# Patient Record
Sex: FEMALE | Race: WHITE | NOT HISPANIC OR LATINO | URBAN - METROPOLITAN AREA
[De-identification: names, ages, dates, MRNs, and addresses within clinical notes are randomized per-mention and may not be internally consistent; named-entity substitution may affect disease eponyms.]

---

## 2017-03-08 ENCOUNTER — ALLSCRIPTS OFFICE VISIT (OUTPATIENT)
Dept: OTHER | Facility: OTHER | Age: 57
End: 2017-03-08

## 2017-03-11 LAB
HPV 18 (HISTORICAL): NOT DETECTED
HPV HIGH RISK 16/18 (HISTORICAL): NOT DETECTED
HPV16 (HISTORICAL): NOT DETECTED
PAP (HISTORICAL): NORMAL

## 2017-11-15 DIAGNOSIS — E03.9 HYPOTHYROIDISM: ICD-10-CM

## 2017-11-15 DIAGNOSIS — E55.9 VITAMIN D DEFICIENCY: ICD-10-CM

## 2017-11-15 DIAGNOSIS — Z13.1 ENCOUNTER FOR SCREENING FOR DIABETES MELLITUS: ICD-10-CM

## 2017-11-15 DIAGNOSIS — Z86.39 PERSONAL HISTORY OF OTHER ENDOCRINE, NUTRITIONAL AND METABOLIC DISEASE: ICD-10-CM

## 2018-01-13 VITALS
DIASTOLIC BLOOD PRESSURE: 74 MMHG | BODY MASS INDEX: 25.52 KG/M2 | HEIGHT: 63 IN | WEIGHT: 144 LBS | SYSTOLIC BLOOD PRESSURE: 128 MMHG

## 2018-01-18 NOTE — PROGRESS NOTES
Assessment    1  Encounter for gynecological examination without abnormal finding (V72 31) (Z01 419)    Plan  Encounter for gynecological examination without abnormal finding    · Decreasing the stress in your life may help your condition improve ; Status:Complete;    Done: [id number]   Ordered;  For:Encounter for gynecological examination without abnormal finding; Ordered By:Socorro Camarillo;   · Drink plenty of fluids ; Status:Complete;   Done: 80CXM7951   Ordered;  For:Encounter for gynecological examination without abnormal finding; Ordered By:Socorro Camarillo;   · Eat a normal well-balanced diet ; Status:Complete;   Done: 36QNJ3448   Ordered;  For:Encounter for gynecological examination without abnormal finding; Ordered By:Socorro Camarillo;   · Vitamins can help you get daily requirements that your diet may not be giving you ;  Status:Complete;   Done: 46LTL2109   Ordered;  For:Encounter for gynecological examination without abnormal finding; Ordered By:Socorro Camarillo;   · We encourage all of our patients to exercise regularly  30 minutes of exercise or physical  activity five or more days a week is recommended for children and adults ;  Status:Complete;   Done: 49ZKB0157   Ordered;  For:Encounter for gynecological examination without abnormal finding; Ordered By:Socorro Camarillo;   · We recommend a colonoscopy ; Status:Complete;   Done: 82FYU2976   Ordered;  For:Encounter for gynecological examination without abnormal finding; Ordered By:Monae Camarillo;   · (B) PAP WITH HPV PLUS; Status: In Progress - Specimen/Data Collected;   Done:  63LYN0907   Perform:[employer]; MUR:[id number]; Ordered;  For:Encounter for gynecological examination without abnormal finding; Ordered By:Socorro Camarillo;   · Follow-up visit in 1 year Evaluation and Treatment  Follow-up  Status: Complete  Done:  81RSQ3499   Ordered;  For: Encounter for gynecological examination without abnormal finding; Ordered By: Gilbert Tucker Performed:  Due: 63FMX6956; Last Updated By: Pati Echeverria; 3/8/2017 5:16:43 PM    Discussion/Summary  healthy adult female HPV and Pap Co-testing Done Today Breast cancer screening: mammogram has been ordered  Colorectal cancer screening: the next colonoscopy is due 2017  Chief Complaint  Patient here for yearly exam       History of Present Illness  GYN , Adult Female [facility]: The patient is being seen for a gynecology evaluation  The last health maintenance visit was 1 year(s) ago  Social History: Household members include spouse, 3 daughter(s), 2 son(s) and twins, (trevin, son) another son and daugheter 25-19  She is   General Health: The patient's health since the last visit is described as good  Lifestyle:  She consumes a diverse and healthy diet  She does not have any weight concerns  She exercises regularly  She does not use tobacco  She denies alcohol use  She denies drug use  Reproductive health: the patient is postmenopausal   she uses contraception  she is sexually active  pregnancy history: G 3P 4   Paragard, inserted 2011  Screening: Cervical cancer screening includes uncertain timing of her last pap smear and uncertain timing of her last human papilloma virus screening  Breast cancer screening includes Thermogram done 1/2017, wnl as per patient  Colorectal cancer screening includes a colonoscopy performed 2007  Review of Systems  no pelvic pain, no pelvic pressure, no vaginal pain, no vaginal discharge, no vaginal itching, no vaginal lump or mass, no vaginal odor, no dysuria, no change in urinary frequency, no feelings of urinary urgency and no urinary loss of control    The patient presents with complaints of nonmenstrual bleeding (some irregular bleeding  uses essential oils has been experimenting with different combinations)  Additional findings include has had paraguard in for 14 years       Constitutional: No fever, no chills, feels well, no tiredness, no recent weight gain or loss  ENT: no ear ache, no loss of hearing, no nosebleeds or nasal discharge, no sore throat or hoarseness  Cardiovascular: no complaints of slow or fast heart rate, no chest pain, no palpitations, no leg claudication or lower extremity edema  Respiratory: no complaints of shortness of breath, no wheezing, no dyspnea on exertion, no orthopnea or PND  Breasts: no complaints of breast pain, breast lump or nipple discharge  Gastrointestinal: no complaints of abdominal pain, no constipation, no nausea or diarrhea, no vomiting, no bloody stools  Genitourinary: no complaints of dysuria, no incontinence, no pelvic pain, no dysmenorrhea, no vaginal discharge or abnormal vaginal bleeding  Musculoskeletal: no complaints of arthralgia, no myalgia, no joint swelling or stiffness, no limb pain or swelling  Integumentary: no complaints of skin rash or lesion, no itching or dry skin, no skin wounds  Neurological: no complaints of headache, no confusion, no numbness or tingling, no dizziness or fainting  Active Problems    1  Colon cancer screening (V76 51) (Z12 11)   2  Encounter for mammogram to establish baseline mammogram (V76 12) (Z12 31)   3  Hypothyroidism (244 9) (E03 9)   4  Screening for diabetes mellitus (V77 1) (Z13 1)   5   White coat syndrome with high blood pressure without hypertension (796 2) (R03 0)    Past Medical History    · History of Chronic headaches (784 0) (R51)   · History of hypothyroidism (V12 29) (Z86 39)    Surgical History    · History of Appendectomy   · History of Eye Surgery    Family History  Mother    · Family history of diabetes mellitus (V18 0) (Z83 3)  Father    · Family history of Non-melanoma skin cancer  Daughter    · Family history of thyroid disease (V18 19) (Z83 49)  Maternal Grandmother    · Family history of diabetes mellitus (V18 0) (Z83 3)    Social History    · Never a smoker   · Occasional alcohol use    Current Meds   1  Krill Oil 1000 MG Oral Capsule; Take one capsule; Therapy: (Recorded:77Hlr6939) to Recorded   2  Multi For Her 50+ CAPS; Therapy: (Recorded:90Dbs9086) to Recorded   3  Nature-Throid 48 75 MG Oral Tablet; TAKE 1 TABLET BY MOUTH EVERY MORNING   BEFORE BREAKFAST ON EMPTY STOMACH; Therapy: 11Xut9708 to (Evaluate:95Pfc8512)  Requested for: 80Sbe9298; Last   Rx:30Uub4238 Ordered   4  Probiotic Oral Capsule; Therapy: (21 ) to Recorded   5  Vitamin D3 TABS; Therapy: (Recorded:20Pxf0820) to Recorded    Allergies    1  Penicillins    Vitals   Recorded: 53EDK5675 34:51UT   Systolic 824   Diastolic 74   Height 5 ft 2 7 in   Weight 144 lb    BMI Calculated 25 75   BSA Calculated 1 68   LMP      Physical Exam    Constitutional   General appearance: No acute distress, well appearing and well nourished  Genitourinary   External genitalia: Normal and no lesions appreciated  Vagina: Normal, no lesions or dryness appreciated  Urethra: Normal     Urethral meatus: Normal     Bladder: Normal, soft, non-tender and no prolapse or masses appreciated  Cervix: Normal, no palpable masses  (string grasped- IUD removed) An IUD string  Uterus: Normal, non-tender, not enlarged, and no palpable masses  Adnexa/parametria: Normal, non-tender and no fullness or masses appreciated  Chest   Breasts: Normal and no dimpling or skin changes noted  Abdomen   Abdomen: Normal, non-tender, and no organomegaly noted         Future Appointments    Date/Time Provider Specialty Site   03/12/2018 10:00 AM Abdirashid Crum MD Obstetrics/Gynecology [facility]     Signatures   Electronically signed by : Loli Storey MD; Mar  9 2017  8:32AM EST                       (Author)

## 2018-04-23 ENCOUNTER — OFFICE VISIT (OUTPATIENT)
Dept: FAMILY MEDICINE CLINIC | Facility: CLINIC | Age: 58
End: 2018-04-23

## 2018-04-23 VITALS
HEIGHT: 63 IN | TEMPERATURE: 98.5 F | DIASTOLIC BLOOD PRESSURE: 72 MMHG | RESPIRATION RATE: 18 BRPM | SYSTOLIC BLOOD PRESSURE: 118 MMHG | BODY MASS INDEX: 25.23 KG/M2 | HEART RATE: 68 BPM | WEIGHT: 142.4 LBS

## 2018-04-23 DIAGNOSIS — R03.0 WHITE COAT SYNDROME WITH HIGH BLOOD PRESSURE WITHOUT HYPERTENSION: ICD-10-CM

## 2018-04-23 DIAGNOSIS — Z00.00 ROUTINE ADULT HEALTH MAINTENANCE: ICD-10-CM

## 2018-04-23 DIAGNOSIS — E55.9 VITAMIN D DEFICIENCY: ICD-10-CM

## 2018-04-23 DIAGNOSIS — Z76.89 ENCOUNTER TO ESTABLISH CARE WITH NEW DOCTOR: Primary | ICD-10-CM

## 2018-04-23 DIAGNOSIS — E03.9 ACQUIRED HYPOTHYROIDISM: ICD-10-CM

## 2018-04-23 PROCEDURE — 99203 OFFICE O/P NEW LOW 30 MIN: CPT | Performed by: FAMILY MEDICINE

## 2018-04-23 RX ORDER — BIOTIN 1 MG
1 TABLET ORAL DAILY
COMMUNITY
End: 2018-04-23

## 2018-04-23 RX ORDER — THYROID, PORCINE 60 MG/1
65 TABLET ORAL DAILY
Qty: 110 TABLET | Refills: 0 | Status: SHIPPED | OUTPATIENT
Start: 2018-04-23 | End: 2018-05-03

## 2018-04-23 RX ORDER — BIOTIN 5 MG
1000 TABLET ORAL DAILY
COMMUNITY

## 2018-04-23 RX ORDER — THYROID, PORCINE 60 MG/1
65 TABLET ORAL DAILY
Qty: 110 TABLET | Refills: 1 | Status: SHIPPED | OUTPATIENT
Start: 2018-04-23 | End: 2018-04-23 | Stop reason: SDUPTHER

## 2018-04-23 NOTE — ASSESSMENT & PLAN NOTE
Pt currently taking Fabby Hock Throid- 48 75mg (2 tabs) because her TSH was 9 830 in Feb 2018, but she couldn't get a new Rx filled, so doubled the dose instead    -Start Pulte Homes (t3/T4 combination) 65mg, which is approximately 100mcg of Synthroid  *pt not amenable to "prescription drugs"  -Recheck TSH in 6-8 weeks

## 2018-04-23 NOTE — PROGRESS NOTES
FAMILY MEDICINE NEW PATIENT NOTE  Manohar Cam 56 y o  female   DATE: April 23, 2018      ASSESSMENT and PLAN:  Manohar Cam is a 56 y o  female here to establish care with: White coat syndrome with high blood pressure without hypertension  BP Readings from Last 3 Encounters:   04/23/18 118/72   03/08/17 128/74   12/12/16 130/80     Last CMP per pt's records on her phone in Feb 2018 was normal    Not on any meds, was likely an isolated incident  Vitamin D deficiency  Vit D level 55 on recent blood work  Cont daily supplement    Hypothyroidism  Pt currently taking Carolyne Ridgel Throid- 48 75mg (2 tabs) because her TSH was 9 830 in Feb 2018, but she couldn't get a new Rx filled, so doubled the dose instead    -Start Pulte Homes (t3/T4 combination) 65mg, which is approximately 100mcg of Synthroid  *pt not amenable to "prescription drugs"  -Recheck TSH in 6-8 weeks    Routine adult health maintenance  Colonoscopy: normal in 2008, doesn't have insurance, but when she gets it, will likely find a "natural" bowel prep and get the colonoscopy done  Breast cancer: will only do "thermograms," but had an abnormal one in the past, so had to do an US and mammo that showed a cyst, will never get another mammogram done  Pap: last one was normal 1 5 years ago, likely needs q5year Paps      SUBJECTIVE:  Manohar Cam is a 56 y o  female who presents today with a chief complaint of Physical Exam and Establish Care  Pt here to establish care  Pt believes in "holistic" medicine and doesn't want regular preventive care  She likes to treat   Previously had only been doing phone consults for her previous medical care    Pt had bloodwork done by a holistic dentist which was a panel of blood tests that included: CMP, CBC, Lipid panel, CK , G6PD, Vit D were normal  T4- 0 58, TSH- 9 830 (since March, she has been doing two tablets of the 48mg tabs of Nature-Throid)    Medical problems:  1  Hypothyroidism- Nature Throid  2   Vitamin D Defiicency- tends to   3  HLD- pt does krill oil and is trying ketogenic diet    Health Maintenance:  1  Colonoscopy in 2008-   2  Mammogram- Pt does "thermograms"- had an abnormal thermogram in the past, then had a follow up with ultrasound and mammogram that were then normal after showing an initial cyst   Pap smear was in 2017- was about 1 5 years ago, never had any abnormal Paps prior to that      Review of Systems   Constitutional: Negative for chills, fatigue and fever  Respiratory: Negative for cough and shortness of breath  Cardiovascular: Negative for chest pain and palpitations  Gastrointestinal: Negative for blood in stool, constipation and diarrhea  Neurological: Negative for dizziness and headaches  I have reviewed the patient's PMH, Surgical History, Family History, Social History, Medication List and Allergies        Histories Reviewed and Updated 4/23/2018:  Patient's Medications   New Prescriptions    CHOLECALCIFEROL (VITAMIN D3) 50042 UNITS CAPSULE    5,000u qday    THYROID (ARMOUR) 65 MG TABLET    Take 1 tablet (65 mg total) by mouth daily   Previous Medications    KRILL OIL 1000 MG CAPS    Take 1,000 mg by mouth daily    MULTIPLE VITAMINS-MINERALS (MULTI FOR HER 50+ PO)    Take 1 tablet by mouth daily    PROBIOTIC PRODUCT (PROBIOTIC DAILY PO)    Take 1 capsule by mouth daily   Modified Medications    No medications on file   Discontinued Medications    CHOLECALCIFEROL (VITAMIN D3) 1000 UNITS CAPS    Take 1 capsule by mouth daily    THYROID (NATURE-THROID) 48 75 MG TABS    Take 1 tablet by mouth daily     Allergies   Allergen Reactions    Penicillins      Past Medical History:   Diagnosis Date    Chronic headaches     Hypothyroidism      Past Surgical History:   Procedure Laterality Date    APPENDECTOMY      EYE SURGERY      TONSILLECTOMY       Social History     Social History    Marital status: /Civil Union     Spouse name: N/A    Number of children: N/A    Years of education: N/A     Occupational History    Not on file  Social History Main Topics    Smoking status: Never Smoker    Smokeless tobacco: Never Used    Alcohol use Yes      Comment: occasional    Drug use: No    Sexual activity: Not on file     Other Topics Concern    Not on file     Social History Narrative    No narrative on file     Family History   Problem Relation Age of Onset    Diabetes Mother     Skin cancer Father      non-melanoma    Diabetes Maternal Grandmother      mellitus    Thyroid disease Daughter      Immunization History   Administered Date(s) Administered    Influenza Quadrivalent, 6-35 Months IM 12/12/2016     OBJECTIVE:  /72 (BP Location: Left arm, Patient Position: Sitting, Cuff Size: Adult)   Pulse 68   Temp 98 5 °F (36 9 °C) (Tympanic)   Resp 18   Ht 5' 2 5" (1 588 m)   Wt 64 6 kg (142 lb 6 4 oz)   LMP 04/02/2018   BMI 25 63 kg/m²   Physical Exam   Constitutional: She appears well-developed and well-nourished  No distress  Neck: Normal range of motion  Neck supple  No JVD present  No tracheal deviation present  No thyromegaly present  Cardiovascular: Normal rate, regular rhythm and normal heart sounds  No murmur heard  Pulmonary/Chest: Effort normal and breath sounds normal  No respiratory distress  She has no wheezes  She has no rales  Lymphadenopathy:     She has no cervical adenopathy  Skin: She is not diaphoretic  Vitals reviewed  Toño Rodriguez MD

## 2018-04-23 NOTE — ASSESSMENT & PLAN NOTE
Colonoscopy: normal in 2008, doesn't have insurance, but when she gets it, will likely find a "natural" bowel prep and get the colonoscopy done  Breast cancer: will only do "thermograms," but had an abnormal one in the past, so had to do an US and mammo that showed a cyst, will never get another mammogram done  Pap: last one was normal 1 5 years ago, likely needs q5year Paps

## 2018-04-23 NOTE — ASSESSMENT & PLAN NOTE
BP Readings from Last 3 Encounters:   04/23/18 118/72   03/08/17 128/74   12/12/16 130/80     Last CMP per pt's records on her phone in Feb 2018 was normal    Not on any meds, was likely an isolated incident

## 2018-05-01 DIAGNOSIS — E03.9 ACQUIRED HYPOTHYROIDISM: ICD-10-CM

## 2018-05-03 ENCOUNTER — TELEPHONE (OUTPATIENT)
Dept: FAMILY MEDICINE CLINIC | Facility: CLINIC | Age: 58
End: 2018-05-03

## 2018-05-03 DIAGNOSIS — E03.9 ACQUIRED HYPOTHYROIDISM: Primary | ICD-10-CM

## 2018-05-03 RX ORDER — LEVOTHYROXINE AND LIOTHYRONINE 38; 9 UG/1; UG/1
60 TABLET ORAL DAILY
Qty: 30 TABLET | Refills: 2 | Status: SHIPPED | OUTPATIENT
Start: 2018-05-03 | End: 2018-08-09

## 2018-08-09 ENCOUNTER — TELEPHONE (OUTPATIENT)
Dept: FAMILY MEDICINE CLINIC | Facility: CLINIC | Age: 58
End: 2018-08-09

## 2018-08-09 DIAGNOSIS — E03.9 ACQUIRED HYPOTHYROIDISM: Primary | ICD-10-CM

## 2018-08-09 NOTE — TELEPHONE ENCOUNTER
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

## 2018-08-09 NOTE — TELEPHONE ENCOUNTER
Pt called  She faxed over her lab work results  She would like for you to review them  She stated she looked over her own results and it looks to her she would need to increase her dosage for Naturethroid  At the moment her dosage for Cierra cuenca is  65 mg one tab daily  I called [facility] and left a message to see if we received her fax  Patient also stated she would like to speak to you directly  If a new prescription for patient needs to be sent she would like it to be sent to the [facility] [phone]   Patient would like a return call [phone]

## 2018-08-10 ENCOUNTER — TELEPHONE (OUTPATIENT)
Dept: FAMILY MEDICINE CLINIC | Facility: CLINIC | Age: 58
End: 2018-08-10

## 2018-08-10 DIAGNOSIS — E03.9 ACQUIRED HYPOTHYROIDISM: ICD-10-CM

## 2018-08-10 RX ORDER — THYROID, PORCINE 60 MG/1
130 TABLET ORAL DAILY
Qty: 180 TABLET | Refills: 0 | Status: SHIPPED | OUTPATIENT
Start: 2018-08-10 | End: 2019-01-21 | Stop reason: SDUPTHER

## 2018-08-10 RX ORDER — THYROID, PORCINE 60 MG/1
130 TABLET ORAL DAILY
Qty: 180 TABLET | Refills: 0 | Status: SHIPPED | OUTPATIENT
Start: 2018-08-10 | End: 2018-08-10 | Stop reason: SDUPTHER

## 2018-08-10 NOTE — TELEPHONE ENCOUNTER
Patient notified, states that she will not take synthroid  Will only take 1 5 times of Naturethroid and if at 6 weeks when blood work is done will increase to 2 qd if levels have not increased   notified and updated script sent to  for sig

## 2018-08-10 NOTE — TELEPHONE ENCOUNTER
[facility] called and said patient contacted them that the wrong prescription was sent and it needed to be Ayesha Hoid 65 mg  Please advise   Pharmacy needs a verbal

## 2019-01-16 ENCOUNTER — TELEPHONE (OUTPATIENT)
Dept: FAMILY MEDICINE CLINIC | Facility: CLINIC | Age: 59
End: 2019-01-16

## 2019-01-16 NOTE — TELEPHONE ENCOUNTER
Patient called  She needs RX sent in for nature thyroid, she is not sure what dosing you had wanted her to take, but [address] [place] [address] has 100 pills currently and she is leaving for vacation at 4 AM and will need to take an RX with her   Please send RX to pharmacy today and then patient would like you to call her at some point to discuss appropriate dosing

## 2019-01-16 NOTE — TELEPHONE ENCOUNTER
1  If patient has a Rx request she has to contact the office 48-72 hours in advance, she cannot call <24 hours before she is due to leave, there is no guarantee that the message gets addressed per office policy, which she has been told before, but this is her second reminder  2  If she is going to get labs done that are not ordered by me, but I am managing the medication dosages, she cannot drop off 4 months of lab tests with her own management of the doses, as we had discussed when we met in April  If I am managing medications that require a prescription, I need to be managing the dosages, she cannot be changing it by herself  3  Her thyroid function is uncontrolled on NatureThroid  The dosage is not the only issue, the type of thyroid medication that is in NatureThroid is not actually changing the content of her thyroid hormones  I cannot continue to give her prescriptions for a medication that is not working or is inappropriate  That being said, she has thyroid disease that needs to be treated and for which I would recommend an appropriate medication, such as levothyroxine, since the current one is not working  I know she has had reservations about this before, but that is the safest alternative for her treatment  If she is not comfortable, I want her to be aware of the reasoning and she can seek care elsewhere       Thanks      This is just my FYI  I reviewed all of the patient's labs as below  08/7/2018 TSH 10 03 (high), T4 2 6 (low), free thyroxine index 2 8 (on NatureThroid 65mg)  10/02/2018 TSH 0 031 (low), free T4 3 0 (low), vitamin-D 66 6, ferritin 108 (on NatureThroid 1 5 tabs of 65mg)  01/12/2019 TSH 3 410(WNL), free T4 2 3 (low)- (on NatureThroid 65mg)

## 2019-01-17 ENCOUNTER — TELEPHONE (OUTPATIENT)
Dept: FAMILY MEDICINE CLINIC | Facility: CLINIC | Age: 59
End: 2019-01-17

## 2019-01-17 NOTE — TELEPHONE ENCOUNTER
Pt called stating no one has tried to contact her and she wishes to speak with Dr Dontae Pelayo directly  I read her Dr Deion Diaz message which we had tried to call her about yesterday  Pt says her thyroid condition was controlled for years on this medication so she will look for another provider who can help her with this

## 2019-01-17 NOTE — TELEPHONE ENCOUNTER
Left message AGAIN on phone number listed in chart  I am not sure if this in incorrect number, apparently it is if she did not get message from yesterday

## 2019-01-21 DIAGNOSIS — E03.9 ACQUIRED HYPOTHYROIDISM: ICD-10-CM

## 2019-01-21 RX ORDER — THYROID, PORCINE 60 MG/1
130 TABLET ORAL DAILY
Qty: 60 TABLET | Refills: 0 | Status: SHIPPED | OUTPATIENT
Start: 2019-01-21 | End: 2019-01-21 | Stop reason: SDUPTHER

## 2019-01-21 RX ORDER — THYROID, PORCINE 60 MG/1
120 TABLET ORAL DAILY
Qty: 60 TABLET | Refills: 0 | Status: SHIPPED | OUTPATIENT
Start: 2019-01-21 | End: 2019-01-22 | Stop reason: SDUPTHER

## 2019-01-21 NOTE — TELEPHONE ENCOUNTER
Please see previous telephone encounter for details  I am sending this Rx in at patient's insistence after multiple discussions that this medications is not adequately treating her thyroid condition  She is unwilling to try any other alternatives or follow the treatment plan  Despite multiple long conversations in regards to the inappropriateness of the medication, pt insisted, became belligered on multiple occasions and threatened to show up at my partner's home to discuss this with him  She was told this was inappropriate behavior and could not treat staff that way  Also was told that she cannot manage her medications, dosages and lab frequency at her [facility]  Patient was not amenable to any of this, so she will be sent a later dated today, stating that this is no longer a therapeutic physician-patient relationship  As per protocol and courtesy, I will send in a last 30 day Rx for this medication with the understanding that is not appropriate treatment  Pt aware, disagrees and assumes any and all risk

## 2019-01-21 NOTE — TELEPHONE ENCOUNTER
Pharmacy in [place] called stating they do not carry the 65mg for thyroid  He stated they have 40 mg, 60 mg, 90 mg, 120 mg, 180 mg, 240 mg and 300 mg  Pharmacy would like a return call with dosage and new instructions   Please advise

## 2019-01-22 RX ORDER — THYROID, PORCINE 60 MG/1
TABLET ORAL
Qty: 60 TABLET | Refills: 0 | Status: SHIPPED | OUTPATIENT
Start: 2019-01-22

## 2019-01-22 RX ORDER — THYROID, PORCINE 60 MG/1
120 TABLET ORAL DAILY
Qty: 60 TABLET | Refills: 0 | Status: SHIPPED | OUTPATIENT
Start: 2019-01-22

## 2019-01-22 NOTE — TELEPHONE ENCOUNTER
Pharmacy called again stating the script needed to be sent in as Naturethroid 65mg not Thyroid(Amour) if not the Thyroid Amour is available in 60 mg and 90 mg  Please advise  Pharmacy would like a return call

## 2019-01-22 NOTE — TELEPHONE ENCOUNTER
I sent it in again with dispense as written, there is no medication in EPIC for "NatureThroid", so you can give the pharmacy a verbal since this is my only option

## 2019-07-01 DIAGNOSIS — Z12.11 COLON CANCER SCREENING: Primary | ICD-10-CM

## 2022-07-20 NOTE — TELEPHONE ENCOUNTER
PHARMACY NOTIFIED
[facility] pharmacy called  Patient's prescription for Thyroid([place]) 65 mg   is no longer available  Prescription needs to read [place] Thyroid 60 mg and if the instructions need to change to please let them know  Patient would need a new script to go through     [facility] [phone]
resent
[phone]